# Patient Record
Sex: MALE | Race: WHITE | HISPANIC OR LATINO | URBAN - METROPOLITAN AREA
[De-identification: names, ages, dates, MRNs, and addresses within clinical notes are randomized per-mention and may not be internally consistent; named-entity substitution may affect disease eponyms.]

---

## 2017-04-09 ENCOUNTER — HOSPITAL ENCOUNTER (EMERGENCY)
Facility: HOSPITAL | Age: 42
Discharge: HOME | End: 2017-04-09
Attending: EMERGENCY MEDICINE

## 2017-04-09 VITALS
WEIGHT: 135 LBS | DIASTOLIC BLOOD PRESSURE: 90 MMHG | OXYGEN SATURATION: 99 % | RESPIRATION RATE: 18 BRPM | SYSTOLIC BLOOD PRESSURE: 130 MMHG | HEART RATE: 84 BPM

## 2017-04-09 DIAGNOSIS — T23.201A: Primary | ICD-10-CM

## 2017-04-09 DIAGNOSIS — T23.271A: Primary | ICD-10-CM

## 2017-04-09 PROCEDURE — 63600175 PHARM REV CODE 636 W HCPCS: Performed by: EMERGENCY MEDICINE

## 2017-04-09 PROCEDURE — 99284 EMERGENCY DEPT VISIT MOD MDM: CPT

## 2017-04-09 PROCEDURE — 25000003 PHARM REV CODE 250: Performed by: EMERGENCY MEDICINE

## 2017-04-09 PROCEDURE — 90471 IMMUNIZATION ADMIN: CPT | Performed by: EMERGENCY MEDICINE

## 2017-04-09 PROCEDURE — 99284 EMERGENCY DEPT VISIT MOD MDM: CPT | Mod: ,,, | Performed by: EMERGENCY MEDICINE

## 2017-04-09 PROCEDURE — 90714 TD VACC NO PRESV 7 YRS+ IM: CPT | Performed by: EMERGENCY MEDICINE

## 2017-04-09 RX ORDER — HYDROCODONE BITARTRATE AND ACETAMINOPHEN 5; 325 MG/1; MG/1
2 TABLET ORAL
Status: COMPLETED | OUTPATIENT
Start: 2017-04-09 | End: 2017-04-09

## 2017-04-09 RX ORDER — SILVER SULFADIAZINE 10 G/1000G
1 CREAM TOPICAL 2 TIMES DAILY
Status: DISCONTINUED | OUTPATIENT
Start: 2017-04-10 | End: 2017-04-09

## 2017-04-09 RX ORDER — SILVER SULFADIAZINE 10 G/1000G
1 CREAM TOPICAL 2 TIMES DAILY
Status: DISCONTINUED | OUTPATIENT
Start: 2017-04-09 | End: 2017-04-10 | Stop reason: HOSPADM

## 2017-04-09 RX ORDER — HYDROCODONE BITARTRATE AND ACETAMINOPHEN 5; 325 MG/1; MG/1
1 TABLET ORAL EVERY 6 HOURS PRN
Qty: 12 TABLET | Refills: 0 | Status: SHIPPED | OUTPATIENT
Start: 2017-04-09

## 2017-04-09 RX ADMIN — SILVER SULFADIAZINE 1 TUBE: 10 CREAM TOPICAL at 11:04

## 2017-04-09 RX ADMIN — HYDROCODONE BITARTRATE AND ACETAMINOPHEN 2 TABLET: 5; 325 TABLET ORAL at 11:04

## 2017-04-09 RX ADMIN — CLOSTRIDIUM TETANI TOXOID ANTIGEN (FORMALDEHYDE INACTIVATED) AND CORYNEBACTERIUM DIPHTHERIAE TOXOID ANTIGEN (FORMALDEHYDE INACTIVATED) 0.5 ML: 5; 2 INJECTION, SUSPENSION INTRAMUSCULAR at 11:04
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
## 2017-04-10 NOTE — DISCHARGE INSTRUCTIONS
[place], [place] Carlos Eduardo [Burn, Second Degree]  Robert quemadura ocurre cuando se expone la piel a calor excesivo, el sol o productos químicos amber. Robert quemadura de dorene carlos eduardo es más profunda que robert quemadura de primer carlos eduardo. Por lo general provoca la formación de ampollas. Las ampollas pueden permanecer intactas y gradualmente desaparecer por sí solas. O pueden reventarse. El objetivo del tratamiento es aliviar el dolor y prevenir la infección mientras la quemadura lovely.  Cuidados En [place]:  Medicamentos:  [place] los medicamentos para el dolor (analgésicos) arnel le indiquen. Si no le recetaron medicamentos para el dolor, puede rupesh acetaminofén (arnel Tylenol) o ibuprofeno (arnel Motrin o Advil) para controlar el dolor. IMPORTANTE: Si tiene robert enfermedad crónica del hígado o los riñones, o si ha tenido robert úlcera estomacal o sangrado gastrointestinal, no use estos medicamentos sin consultar arelis a simeon médico.  Cuidados Generales   · El primer día puede aplicarse compresas frías (robert toalla pequeña empapada en agua fría) para aliviar el dolor.  · Si lo enviaron a casa con las ampollas intactas, no las reviente. El riesgo de infección es mayor si la ampolla se revienta. Si llega a reventarse la ampolla:  ¨ Si el tamaño de la ampolla es de menos de 3 pulgadas, puede usar unas tijeritas limpias para recortar la piel suelta. Ésta no tendrá sensibilidad, así que no dolerá. Las tijeritas deben arelis lavarse con agua y jabón, luego frotarse con alcohol y por últimos enjuagarse nuevamente con agua. Después de quitar la piel de la ampolla reventada, siga las instrucciones que se dan a continuación.  ¨ Si el tamaño de la ampolla es de más de 3 pulgadas, busque atención médica para que le quiten la piel de la ampolla y le limpien la herida.  · Si le ponen un vendaje, cámbielo robert vez al día, a no ser que le indiquen lo contrario. Si el vendaje se moja o ensucia, cámbielo tan pronto arnel sea posible. Para cambiar el  vendaje:  ¨ Lávese las letitia.  ¨ Quite el vendaje aster. Si el vendaje se pega, remójelo en un chorro de agua tibia.  ¨ Robert vez que haya quitado el vendaje, cuidadosamente lave la lin quemada con agua tibia y jabón para limpiar cualquier crema, ungüento, supuración o costra. Puede hacer esto en un lavabo, bajo un chorro de agua tibia o en la ducha. Enjuáguese el jabón y séquese dando palmaditas con robert toalla limpia.  ¨ Revise si hay alguna de las señales de infección mencionadas más abajo.  ¨ Vuelva a aplicarse cualquier crema antibiótica que le hayan recetado.  ¨ Cubra la quemadura con gasa antiadherente. Luego envuélvala con el vendaje.  · Ocasionalmente, puede presentarse robert infección a pesar del tratamiento adecuado. Por consiguiente, revise la quemadura diariamente para migel si hay alguna de las señales de infección que se mencionan más abajo.  Danielle robert SOBEIDA DE CONTROL con simeon médico o arnel le indique nuestro personal.  Obtenga Atención Médica Inmediata Si Se Presenta Algo De Lo Siguiente:  Señales de infección:  · Fiebre por encima de 100.4°F (38°C)  · Aumento del dolor  · Aumento del enrojecimiento o la hinchazón, o pus que drena de la quemadura  · Vetas rojizas en la piel que salen de la quemadura  Date Last Reviewed: 10/24/2014  © 0025-6811 [employer]. [address] [place] [address], [place] [address]. Todos los derechos reservados. Esta información no pretende sustituir la atención médica profesional. Sólo simeon médico puede diagnosticar y tratar un problema de cinthia.
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
## 2017-09-09 ENCOUNTER — HOSPITAL ENCOUNTER (EMERGENCY)
Dept: HOSPITAL 15 - ER | Age: 42
Discharge: HOME | End: 2017-09-09
Payer: MEDICAID

## 2017-09-09 VITALS — WEIGHT: 274 LBS | HEIGHT: 69 IN | BODY MASS INDEX: 40.58 KG/M2

## 2017-09-09 VITALS — DIASTOLIC BLOOD PRESSURE: 98 MMHG | SYSTOLIC BLOOD PRESSURE: 202 MMHG

## 2017-09-09 DIAGNOSIS — S46.001A: Primary | ICD-10-CM

## 2017-09-09 DIAGNOSIS — R03.0: ICD-10-CM
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
## 2017-09-09 DIAGNOSIS — M62.830: ICD-10-CM

## 2017-09-09 DIAGNOSIS — Z88.6: ICD-10-CM
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
## 2017-09-09 DIAGNOSIS — Z88.1: ICD-10-CM
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
## 2017-09-09 DIAGNOSIS — Z79.899: ICD-10-CM

## 2017-09-09 PROCEDURE — 99283 EMERGENCY DEPT VISIT LOW MDM: CPT

## 2017-09-09 PROCEDURE — 96372 THER/PROPH/DIAG INJ SC/IM: CPT

## 2020-03-04 NOTE — ED NOTES
Physician at bedside.     Samples Given: Cetaphil cleanser and moisturizer\\nAveeno ultra calming with SPF Detail Level: Zone Samples Given: Elta Clear\\nElta Clear TINTED\\nCeraVe hydrating sunscreen

## 2020-03-05 ENCOUNTER — HOSPITAL ENCOUNTER (EMERGENCY)
Dept: HOSPITAL 15 - ER | Age: 45
LOS: 1 days | Discharge: HOME | End: 2020-03-06
Payer: MEDICAID

## 2020-03-05 VITALS — BODY MASS INDEX: 40.14 KG/M2 | HEIGHT: 69 IN | WEIGHT: 271 LBS

## 2020-03-05 VITALS — DIASTOLIC BLOOD PRESSURE: 82 MMHG | SYSTOLIC BLOOD PRESSURE: 135 MMHG

## 2020-03-05 DIAGNOSIS — Z87.891: ICD-10-CM

## 2020-03-05 DIAGNOSIS — K21.9: ICD-10-CM

## 2020-03-05 DIAGNOSIS — J18.9: Primary | ICD-10-CM

## 2020-03-05 DIAGNOSIS — I10: ICD-10-CM

## 2020-03-05 LAB
ALBUMIN SERPL-MCNC: 3.5 G/DL (ref 3.4–5)
ALP SERPL-CCNC: 70 U/L (ref 45–117)
ALT SERPL-CCNC: 84 U/L (ref 16–61)
ANION GAP SERPL CALCULATED.3IONS-SCNC: 4 MMOL/L (ref 5–15)
BILIRUB SERPL-MCNC: 0.5 MG/DL (ref 0.2–1)
BUN SERPL-MCNC: 17 MG/DL (ref 7–18)
BUN/CREAT SERPL: 17
CALCIUM SERPL-MCNC: 8.6 MG/DL (ref 8.5–10.1)
CHLORIDE SERPL-SCNC: 101 MMOL/L (ref 98–107)
CO2 SERPL-SCNC: 29 MMOL/L (ref 21–32)
GLUCOSE SERPL-MCNC: 99 MG/DL (ref 74–106)
HCT VFR BLD AUTO: 40.3 % (ref 41–53)
HGB BLD-MCNC: 14.1 G/DL (ref 13.5–17.5)
MCH RBC QN AUTO: 31.8 PG (ref 28–32)
MCV RBC AUTO: 91.1 FL (ref 80–100)
NRBC BLD QL AUTO: 0 %
POTASSIUM SERPL-SCNC: 4 MMOL/L (ref 3.5–5.1)
PROT SERPL-MCNC: 8 G/DL (ref 6.4–8.2)
SODIUM SERPL-SCNC: 134 MMOL/L (ref 136–145)

## 2020-03-05 PROCEDURE — 85025 COMPLETE CBC W/AUTO DIFF WBC: CPT

## 2020-03-05 PROCEDURE — 99284 EMERGENCY DEPT VISIT MOD MDM: CPT

## 2020-03-05 PROCEDURE — 80053 COMPREHEN METABOLIC PANEL: CPT

## 2020-03-05 PROCEDURE — 96372 THER/PROPH/DIAG INJ SC/IM: CPT

## 2020-03-05 PROCEDURE — 36415 COLL VENOUS BLD VENIPUNCTURE: CPT

## 2020-03-05 PROCEDURE — 71046 X-RAY EXAM CHEST 2 VIEWS: CPT

## 2020-03-05 PROCEDURE — 84484 ASSAY OF TROPONIN QUANT: CPT

## 2024-10-17 ENCOUNTER — HOSPITAL ENCOUNTER (OUTPATIENT)
Dept: HOSPITAL 15 - GI | Age: 49
Discharge: HOME | End: 2024-10-17
Attending: INTERNAL MEDICINE
Payer: COMMERCIAL

## 2024-10-17 VITALS — RESPIRATION RATE: 11 BRPM | HEART RATE: 96 BPM | OXYGEN SATURATION: 95 % | TEMPERATURE: 99 F

## 2024-10-17 VITALS
DIASTOLIC BLOOD PRESSURE: 85 MMHG | OXYGEN SATURATION: 96 % | HEART RATE: 76 BPM | SYSTOLIC BLOOD PRESSURE: 120 MMHG | RESPIRATION RATE: 14 BRPM

## 2024-10-17 VITALS — WEIGHT: 258 LBS | BODY MASS INDEX: 38.21 KG/M2 | HEIGHT: 69 IN

## 2024-10-17 DIAGNOSIS — K92.1: Primary | ICD-10-CM

## 2024-10-17 DIAGNOSIS — D12.3: ICD-10-CM

## 2024-10-17 DIAGNOSIS — F17.290: ICD-10-CM

## 2024-10-17 DIAGNOSIS — K64.8: ICD-10-CM

## 2024-10-17 DIAGNOSIS — I10: ICD-10-CM

## 2024-10-17 DIAGNOSIS — D12.4: ICD-10-CM

## 2024-10-17 DIAGNOSIS — D12.2: ICD-10-CM

## 2024-10-17 DIAGNOSIS — K57.30: ICD-10-CM

## 2024-10-17 DIAGNOSIS — Z79.899: ICD-10-CM

## 2024-10-17 DIAGNOSIS — Z88.5: ICD-10-CM

## 2024-10-17 DIAGNOSIS — Z88.2: ICD-10-CM

## 2024-10-17 PROCEDURE — 88305 TISSUE EXAM BY PATHOLOGIST: CPT

## 2024-10-17 PROCEDURE — 45380 COLONOSCOPY AND BIOPSY: CPT

## 2024-10-17 PROCEDURE — 45385 COLONOSCOPY W/LESION REMOVAL: CPT
